# Patient Record
Sex: FEMALE | Race: ASIAN | NOT HISPANIC OR LATINO | URBAN - METROPOLITAN AREA
[De-identification: names, ages, dates, MRNs, and addresses within clinical notes are randomized per-mention and may not be internally consistent; named-entity substitution may affect disease eponyms.]

---

## 2020-01-03 PROCEDURE — 99285 EMERGENCY DEPT VISIT HI MDM: CPT

## 2021-01-01 ENCOUNTER — INPATIENT (INPATIENT)
Facility: HOSPITAL | Age: 61
LOS: 1 days | End: 2021-01-04
Attending: OBSTETRICS & GYNECOLOGY | Admitting: OBSTETRICS & GYNECOLOGY
Payer: MEDICARE

## 2021-01-01 VITALS
HEART RATE: 102 BPM | OXYGEN SATURATION: 92 % | RESPIRATION RATE: 3 BRPM | SYSTOLIC BLOOD PRESSURE: 75 MMHG | TEMPERATURE: 98 F | DIASTOLIC BLOOD PRESSURE: 49 MMHG

## 2021-01-01 VITALS
OXYGEN SATURATION: 98 % | SYSTOLIC BLOOD PRESSURE: 84 MMHG | TEMPERATURE: 95 F | HEART RATE: 125 BPM | DIASTOLIC BLOOD PRESSURE: 58 MMHG | RESPIRATION RATE: 20 BRPM

## 2021-01-01 DIAGNOSIS — K63.1 PERFORATION OF INTESTINE (NONTRAUMATIC): ICD-10-CM

## 2021-01-01 DIAGNOSIS — C79.9 SECONDARY MALIGNANT NEOPLASM OF UNSPECIFIED SITE: ICD-10-CM

## 2021-01-01 LAB
ALBUMIN SERPL ELPH-MCNC: 3.2 G/DL — LOW (ref 3.3–5)
ALP SERPL-CCNC: 123 U/L — HIGH (ref 40–120)
ALT FLD-CCNC: 32 U/L — SIGNIFICANT CHANGE UP (ref 4–33)
ANION GAP SERPL CALC-SCNC: 10 MMOL/L — SIGNIFICANT CHANGE UP (ref 7–14)
ANION GAP SERPL CALC-SCNC: 17 MMOL/L — HIGH (ref 7–14)
APPEARANCE UR: CLEAR — SIGNIFICANT CHANGE UP
APTT BLD: 31.9 SEC — SIGNIFICANT CHANGE UP (ref 27–36.3)
AST SERPL-CCNC: 60 U/L — HIGH (ref 4–32)
B PERT DNA SPEC QL NAA+PROBE: SIGNIFICANT CHANGE UP
BASOPHILS # BLD AUTO: 0 K/UL — SIGNIFICANT CHANGE UP (ref 0–0.2)
BASOPHILS NFR BLD AUTO: 0 % — SIGNIFICANT CHANGE UP (ref 0–2)
BILIRUB SERPL-MCNC: 0.7 MG/DL — SIGNIFICANT CHANGE UP (ref 0.2–1.2)
BILIRUB UR-MCNC: ABNORMAL
BLD GP AB SCN SERPL QL: NEGATIVE — SIGNIFICANT CHANGE UP
BLOOD GAS VENOUS COMPREHENSIVE RESULT: SIGNIFICANT CHANGE UP
BLOOD GAS VENOUS COMPREHENSIVE RESULT: SIGNIFICANT CHANGE UP
BUN SERPL-MCNC: 24 MG/DL — HIGH (ref 7–23)
BUN SERPL-MCNC: 25 MG/DL — HIGH (ref 7–23)
C PNEUM DNA SPEC QL NAA+PROBE: SIGNIFICANT CHANGE UP
CALCIUM SERPL-MCNC: 7.2 MG/DL — LOW (ref 8.4–10.5)
CALCIUM SERPL-MCNC: 9 MG/DL — SIGNIFICANT CHANGE UP (ref 8.4–10.5)
CHLORIDE SERPL-SCNC: 71 MMOL/L — LOW (ref 98–107)
CHLORIDE SERPL-SCNC: 79 MMOL/L — LOW (ref 98–107)
CO2 SERPL-SCNC: 34 MMOL/L — HIGH (ref 22–31)
CO2 SERPL-SCNC: 35 MMOL/L — HIGH (ref 22–31)
COLOR SPEC: YELLOW — SIGNIFICANT CHANGE UP
CREAT SERPL-MCNC: 0.77 MG/DL — SIGNIFICANT CHANGE UP (ref 0.5–1.3)
CREAT SERPL-MCNC: 0.81 MG/DL — SIGNIFICANT CHANGE UP (ref 0.5–1.3)
CULTURE RESULTS: NO GROWTH — SIGNIFICANT CHANGE UP
DIFF PNL FLD: NEGATIVE — SIGNIFICANT CHANGE UP
EOSINOPHIL # BLD AUTO: 0 K/UL — SIGNIFICANT CHANGE UP (ref 0–0.5)
EOSINOPHIL NFR BLD AUTO: 0 % — SIGNIFICANT CHANGE UP (ref 0–6)
FLUAV H1 2009 PAND RNA SPEC QL NAA+PROBE: SIGNIFICANT CHANGE UP
FLUAV H1 RNA SPEC QL NAA+PROBE: SIGNIFICANT CHANGE UP
FLUAV H3 RNA SPEC QL NAA+PROBE: SIGNIFICANT CHANGE UP
FLUAV SUBTYP SPEC NAA+PROBE: SIGNIFICANT CHANGE UP
FLUBV RNA SPEC QL NAA+PROBE: SIGNIFICANT CHANGE UP
GLUCOSE SERPL-MCNC: 103 MG/DL — HIGH (ref 70–99)
GLUCOSE SERPL-MCNC: 122 MG/DL — HIGH (ref 70–99)
GLUCOSE UR QL: NEGATIVE — SIGNIFICANT CHANGE UP
HADV DNA SPEC QL NAA+PROBE: SIGNIFICANT CHANGE UP
HCOV PNL SPEC NAA+PROBE: SIGNIFICANT CHANGE UP
HCT VFR BLD CALC: 40.6 % — SIGNIFICANT CHANGE UP (ref 34.5–45)
HGB BLD-MCNC: 13.5 G/DL — SIGNIFICANT CHANGE UP (ref 11.5–15.5)
HMPV RNA SPEC QL NAA+PROBE: SIGNIFICANT CHANGE UP
HPIV1 RNA SPEC QL NAA+PROBE: SIGNIFICANT CHANGE UP
HPIV2 RNA SPEC QL NAA+PROBE: SIGNIFICANT CHANGE UP
HPIV3 RNA SPEC QL NAA+PROBE: SIGNIFICANT CHANGE UP
HPIV4 RNA SPEC QL NAA+PROBE: SIGNIFICANT CHANGE UP
IANC: 1.98 K/UL — SIGNIFICANT CHANGE UP (ref 1.5–8.5)
INR BLD: <0.9 RATIO — LOW (ref 0.88–1.16)
KETONES UR-MCNC: ABNORMAL
LEUKOCYTE ESTERASE UR-ACNC: NEGATIVE — SIGNIFICANT CHANGE UP
LYMPHOCYTES # BLD AUTO: 0.38 K/UL — LOW (ref 1–3.3)
LYMPHOCYTES # BLD AUTO: 12.2 % — LOW (ref 13–44)
MCHC RBC-ENTMCNC: 26.9 PG — LOW (ref 27–34)
MCHC RBC-ENTMCNC: 33.3 GM/DL — SIGNIFICANT CHANGE UP (ref 32–36)
MCV RBC AUTO: 81 FL — SIGNIFICANT CHANGE UP (ref 80–100)
MONOCYTES # BLD AUTO: 0.15 K/UL — SIGNIFICANT CHANGE UP (ref 0–0.9)
MONOCYTES NFR BLD AUTO: 4.7 % — SIGNIFICANT CHANGE UP (ref 2–14)
NEUTROPHILS # BLD AUTO: 2.27 K/UL — SIGNIFICANT CHANGE UP (ref 1.8–7.4)
NEUTROPHILS NFR BLD AUTO: 63.6 % — SIGNIFICANT CHANGE UP (ref 43–77)
NITRITE UR-MCNC: NEGATIVE — SIGNIFICANT CHANGE UP
PH UR: 6.5 — SIGNIFICANT CHANGE UP (ref 5–8)
PLATELET # BLD AUTO: 342 K/UL — SIGNIFICANT CHANGE UP (ref 150–400)
POTASSIUM SERPL-MCNC: 3.3 MMOL/L — LOW (ref 3.5–5.3)
POTASSIUM SERPL-MCNC: 3.7 MMOL/L — SIGNIFICANT CHANGE UP (ref 3.5–5.3)
POTASSIUM SERPL-SCNC: 3.3 MMOL/L — LOW (ref 3.5–5.3)
POTASSIUM SERPL-SCNC: 3.7 MMOL/L — SIGNIFICANT CHANGE UP (ref 3.5–5.3)
PROT SERPL-MCNC: 5.9 G/DL — LOW (ref 6–8.3)
PROT UR-MCNC: ABNORMAL
PROTHROM AB SERPL-ACNC: 9.9 SEC — LOW (ref 10.6–13.6)
RAPID RVP RESULT: SIGNIFICANT CHANGE UP
RBC # BLD: 5.01 M/UL — SIGNIFICANT CHANGE UP (ref 3.8–5.2)
RBC # FLD: 17.2 % — HIGH (ref 10.3–14.5)
RH IG SCN BLD-IMP: POSITIVE — SIGNIFICANT CHANGE UP
RV+EV RNA SPEC QL NAA+PROBE: SIGNIFICANT CHANGE UP
SARS-COV-2 RNA SPEC QL NAA+PROBE: SIGNIFICANT CHANGE UP
SODIUM SERPL-SCNC: 122 MMOL/L — LOW (ref 135–145)
SODIUM SERPL-SCNC: 124 MMOL/L — LOW (ref 135–145)
SP GR SPEC: 1.03 — HIGH (ref 1.01–1.02)
SPECIMEN SOURCE: SIGNIFICANT CHANGE UP
UROBILINOGEN FLD QL: ABNORMAL
WBC # BLD: 3.11 K/UL — LOW (ref 3.8–10.5)
WBC # FLD AUTO: 3.11 K/UL — LOW (ref 3.8–10.5)

## 2021-01-01 PROCEDURE — 71045 X-RAY EXAM CHEST 1 VIEW: CPT | Mod: 26

## 2021-01-01 PROCEDURE — 74177 CT ABD & PELVIS W/CONTRAST: CPT | Mod: 26

## 2021-01-01 PROCEDURE — 99223 1ST HOSP IP/OBS HIGH 75: CPT

## 2021-01-01 PROCEDURE — 99285 EMERGENCY DEPT VISIT HI MDM: CPT

## 2021-01-01 RX ORDER — FENTANYL CITRATE 50 UG/ML
50 INJECTION INTRAVENOUS ONCE
Refills: 0 | Status: DISCONTINUED | OUTPATIENT
Start: 2021-01-01 | End: 2021-01-01

## 2021-01-01 RX ORDER — ROBINUL 0.2 MG/ML
0.4 INJECTION INTRAMUSCULAR; INTRAVENOUS EVERY 4 HOURS
Refills: 0 | Status: DISCONTINUED | OUTPATIENT
Start: 2021-01-01 | End: 2021-01-01

## 2021-01-01 RX ORDER — HYDROMORPHONE HYDROCHLORIDE 2 MG/ML
1 INJECTION INTRAMUSCULAR; INTRAVENOUS; SUBCUTANEOUS
Refills: 0 | Status: DISCONTINUED | OUTPATIENT
Start: 2021-01-01 | End: 2021-01-01

## 2021-01-01 RX ORDER — SODIUM CHLORIDE 9 MG/ML
1000 INJECTION INTRAMUSCULAR; INTRAVENOUS; SUBCUTANEOUS
Refills: 0 | Status: DISCONTINUED | OUTPATIENT
Start: 2021-01-01 | End: 2021-01-01

## 2021-01-01 RX ORDER — SODIUM CHLORIDE 9 MG/ML
2000 INJECTION INTRAMUSCULAR; INTRAVENOUS; SUBCUTANEOUS ONCE
Refills: 0 | Status: COMPLETED | OUTPATIENT
Start: 2021-01-01 | End: 2021-01-01

## 2021-01-01 RX ORDER — HYDROMORPHONE HYDROCHLORIDE 2 MG/ML
0.2 INJECTION INTRAMUSCULAR; INTRAVENOUS; SUBCUTANEOUS
Qty: 10 | Refills: 0 | Status: DISCONTINUED | OUTPATIENT
Start: 2021-01-01 | End: 2021-01-01

## 2021-01-01 RX ORDER — PIPERACILLIN AND TAZOBACTAM 4; .5 G/20ML; G/20ML
3.38 INJECTION, POWDER, LYOPHILIZED, FOR SOLUTION INTRAVENOUS ONCE
Refills: 0 | Status: COMPLETED | OUTPATIENT
Start: 2021-01-01 | End: 2021-01-01

## 2021-01-01 RX ORDER — SODIUM CHLORIDE 9 MG/ML
1000 INJECTION INTRAMUSCULAR; INTRAVENOUS; SUBCUTANEOUS ONCE
Refills: 0 | Status: COMPLETED | OUTPATIENT
Start: 2021-01-01 | End: 2021-01-01

## 2021-01-01 RX ORDER — HYDROMORPHONE HYDROCHLORIDE 2 MG/ML
1 INJECTION INTRAMUSCULAR; INTRAVENOUS; SUBCUTANEOUS ONCE
Refills: 0 | Status: DISCONTINUED | OUTPATIENT
Start: 2021-01-01 | End: 2021-01-01

## 2021-01-01 RX ORDER — HYDROMORPHONE HYDROCHLORIDE 2 MG/ML
0.2 INJECTION INTRAMUSCULAR; INTRAVENOUS; SUBCUTANEOUS
Qty: 100 | Refills: 0 | Status: DISCONTINUED | OUTPATIENT
Start: 2021-01-01 | End: 2021-01-01

## 2021-01-01 RX ORDER — PIPERACILLIN AND TAZOBACTAM 4; .5 G/20ML; G/20ML
3.38 INJECTION, POWDER, LYOPHILIZED, FOR SOLUTION INTRAVENOUS EVERY 8 HOURS
Refills: 0 | Status: DISCONTINUED | OUTPATIENT
Start: 2021-01-01 | End: 2021-01-01

## 2021-01-01 RX ADMIN — HYDROMORPHONE HYDROCHLORIDE 0.2 MG/HR: 2 INJECTION INTRAMUSCULAR; INTRAVENOUS; SUBCUTANEOUS at 13:10

## 2021-01-01 RX ADMIN — FENTANYL CITRATE 50 MICROGRAM(S): 50 INJECTION INTRAVENOUS at 09:12

## 2021-01-01 RX ADMIN — ROBINUL 0.4 MILLIGRAM(S): 0.2 INJECTION INTRAMUSCULAR; INTRAVENOUS at 01:35

## 2021-01-01 RX ADMIN — HYDROMORPHONE HYDROCHLORIDE 1 MILLIGRAM(S): 2 INJECTION INTRAMUSCULAR; INTRAVENOUS; SUBCUTANEOUS at 13:10

## 2021-01-01 RX ADMIN — HYDROMORPHONE HYDROCHLORIDE 1 MILLIGRAM(S): 2 INJECTION INTRAMUSCULAR; INTRAVENOUS; SUBCUTANEOUS at 12:33

## 2021-01-01 RX ADMIN — ROBINUL 0.4 MILLIGRAM(S): 0.2 INJECTION INTRAMUSCULAR; INTRAVENOUS at 05:39

## 2021-01-01 RX ADMIN — ROBINUL 0.4 MILLIGRAM(S): 0.2 INJECTION INTRAMUSCULAR; INTRAVENOUS at 21:36

## 2021-01-01 RX ADMIN — FENTANYL CITRATE 50 MICROGRAM(S): 50 INJECTION INTRAVENOUS at 02:10

## 2021-01-01 RX ADMIN — ROBINUL 0.4 MILLIGRAM(S): 0.2 INJECTION INTRAMUSCULAR; INTRAVENOUS at 17:08

## 2021-01-01 RX ADMIN — PIPERACILLIN AND TAZOBACTAM 200 GRAM(S): 4; .5 INJECTION, POWDER, LYOPHILIZED, FOR SOLUTION INTRAVENOUS at 02:10

## 2021-01-01 RX ADMIN — FENTANYL CITRATE 50 MICROGRAM(S): 50 INJECTION INTRAVENOUS at 07:33

## 2021-01-01 RX ADMIN — FENTANYL CITRATE 50 MICROGRAM(S): 50 INJECTION INTRAVENOUS at 23:46

## 2021-01-01 RX ADMIN — SODIUM CHLORIDE 2000 MILLILITER(S): 9 INJECTION INTRAMUSCULAR; INTRAVENOUS; SUBCUTANEOUS at 23:01

## 2021-01-01 RX ADMIN — FENTANYL CITRATE 50 MICROGRAM(S): 50 INJECTION INTRAVENOUS at 05:31

## 2021-01-01 RX ADMIN — PIPERACILLIN AND TAZOBACTAM 25 GRAM(S): 4; .5 INJECTION, POWDER, LYOPHILIZED, FOR SOLUTION INTRAVENOUS at 17:07

## 2021-01-01 RX ADMIN — PIPERACILLIN AND TAZOBACTAM 3.38 GRAM(S): 4; .5 INJECTION, POWDER, LYOPHILIZED, FOR SOLUTION INTRAVENOUS at 04:27

## 2021-01-01 RX ADMIN — SODIUM CHLORIDE 100 MILLILITER(S): 9 INJECTION INTRAMUSCULAR; INTRAVENOUS; SUBCUTANEOUS at 05:00

## 2021-01-01 RX ADMIN — FENTANYL CITRATE 50 MICROGRAM(S): 50 INJECTION INTRAVENOUS at 02:09

## 2021-01-01 RX ADMIN — SODIUM CHLORIDE 1000 MILLILITER(S): 9 INJECTION INTRAMUSCULAR; INTRAVENOUS; SUBCUTANEOUS at 04:27

## 2021-01-01 RX ADMIN — SODIUM CHLORIDE 2000 MILLILITER(S): 9 INJECTION INTRAMUSCULAR; INTRAVENOUS; SUBCUTANEOUS at 04:27

## 2021-01-01 RX ADMIN — PIPERACILLIN AND TAZOBACTAM 25 GRAM(S): 4; .5 INJECTION, POWDER, LYOPHILIZED, FOR SOLUTION INTRAVENOUS at 21:37

## 2021-01-01 RX ADMIN — FENTANYL CITRATE 50 MICROGRAM(S): 50 INJECTION INTRAVENOUS at 04:27

## 2021-01-01 RX ADMIN — FENTANYL CITRATE 50 MICROGRAM(S): 50 INJECTION INTRAVENOUS at 06:30

## 2021-01-01 RX ADMIN — HYDROMORPHONE HYDROCHLORIDE 1 MILLIGRAM(S): 2 INJECTION INTRAMUSCULAR; INTRAVENOUS; SUBCUTANEOUS at 10:20

## 2021-01-01 RX ADMIN — SODIUM CHLORIDE 1000 MILLILITER(S): 9 INJECTION INTRAMUSCULAR; INTRAVENOUS; SUBCUTANEOUS at 03:26

## 2021-01-02 NOTE — ED ADULT NURSE NOTE - OBJECTIVE STATEMENT
Patient arrives to ED for weakness. Patient able to state her own name. Son at bedside. Patient has midline to left arm but XRAY did not confirm placement. Patient arrives to ED for weakness. Patient able to state her own name. Son at bedside. Patient has midline to left arm but XRAY did not confirm placement. 22g IV placed to right wrist. 18g IV placed via ultrasound to right antecubital area. Per son, patient has peg tube to midline upper abdomen, draining dark, clear, bile appearing drainage. 14 Fr roach placed, 300ml dark yahaira urine in roach bag. Patient arrives to ED for weakness. Patient able to state her own name. Son at bedside. Patient has midline to left arm but XRAY did not confirm placement. 22g IV placed to right wrist. 18g IV placed via ultrasound to right antecubital area. Per son, patient has peg tube to midline upper abdomen, draining dark, clear, bile appearing drainage. Peg tube site clean dry and intact. Belly distended and firm.  14 Fr roach placed, 300ml dark yahaira urine in roach bag. Patient breathing even and on labored, on 3L NC (on 3L NC at home). Buttocks skin intact. Labs sent. Patient medicated ordered. COVID swab sent. Safety maintained. Will continue to monitor.

## 2021-01-02 NOTE — ED ADULT TRIAGE NOTE - CHIEF COMPLAINT QUOTE
patient baseline unresponsive. as per EMS patient had questionable syncope at home. patient hypotensive at triage. as per EMS, patient family had wanted DNR, but no paperwork was presented. patient hypotensive and tachycardic in triage. 3LNC baseline from home   Brice- - Son

## 2021-01-03 NOTE — ED PROVIDER NOTE - ATTENDING CONTRIBUTION TO CARE
DR. BLOCH, ATTENDING MD-  I performed a face to face bedside interview with patient regarding history of present illness, review of symptoms and past medical history. I completed an independent physical exam.  I have discussed patient's plan of care with the resident.  Patient ill appearing, hypotensive,extremely distended tympanitic abd, very tender, large tube draining bilious fluid in epigastrium, extrem with some mottling in thighs, decreased pulses, hearst tachycardic, lungs clear. not answring questions, moaning. Condition guarded.  Spoke extensively with son about goals of care. he stated his mother declined intubation and resuscitation, though he wanted more time with her. Agree with DNI, DNR, comfort measures and noninvasive treatment

## 2021-01-03 NOTE — H&P ADULT - NSHPPHYSICALEXAM_GEN_ALL_CORE
General:frail  Respiratory: CTA B/L  CV: RRR, S1S2, no murmurs, rubs or gallops  Abdominal: Soft, NT, ND +BS, Last BM  Extremities: edema +

## 2021-01-03 NOTE — H&P ADULT - ATTENDING COMMENTS
Patient seen and evaluated in the ED.   Progressive metastatic chemotherapy-refractory ovarian cancer on home hospice care.   Now presents with bowel obstruction and sepsis.   Goals of care discussed with patient and her sons.   Admit for palliative/comfort care.   Will initiate narcotic analgesia drip.   Private room so that patient's family can remain at bedside.   Patient's primary gynecologic oncologist, Dr. Saritha Wen at [facility], notified of admission and terminal status.

## 2021-01-03 NOTE — H&P ADULT - NSHPLABSRESULTS_GEN_ALL_CORE
Lab Results:  CBC  CBC Full  -  ( 2021 23:22 )  WBC Count : 3.11 K/uL  RBC Count : 5.01 M/uL  Hemoglobin : 13.5 g/dL  Hematocrit : 40.6 %  Platelet Count - Automated : 342 K/uL  Mean Cell Volume : 81.0 fL  Mean Cell Hemoglobin : 26.9 pg  Mean Cell Hemoglobin Concentration : 33.3 gm/dL  Auto Neutrophil # : 2.27 K/uL  Auto Lymphocyte # : 0.38 K/uL  Auto Monocyte # : 0.15 K/uL  Auto Eosinophil # : 0.00 K/uL  Auto Basophil # : 0.00 K/uL  Auto Neutrophil % : 63.6 %  Auto Lymphocyte % : 12.2 %  Auto Monocyte % : 4.7 %  Auto Eosinophil % : 0.0 %  Auto Basophil % : 0.0 %    .		Differential:	[] Automated		[] Manual  Chemistry                        13.5   3.11  )-----------( 342      ( 2021 23:22 )             40.6     01-03    124<L>  |  79<L>  |  24<H>  ----------------------------<  103<H>  3.7   |  35<H>  |  0.77    Ca    7.2<L>      2021 01:39    TPro  5.9<L>  /  Alb  3.2<L>  /  TBili  0.7  /  DBili  x   /  AST  60<H>  /  ALT  32  /  AlkPhos  123<H>      LIVER FUNCTIONS - ( 2021 23:22 )  Alb: 3.2 g/dL / Pro: 5.9 g/dL / ALK PHOS: 123 U/L / ALT: 32 U/L / AST: 60 U/L / GGT: x           PT/INR - ( 2021 23:22 )   PT: 9.9 sec;   INR: <0.90 ratio         PTT - ( 2021 23:22 )  PTT:31.9 sec  Urinalysis Basic - ( 2021 23:52 )    Color: Yellow / Appearance: Clear / S.029 / pH: x  Gluc: x / Ketone: Small  / Bili: Small / Urobili: 6 mg/dL   Blood: x / Protein: 30 mg/dL / Nitrite: Negative   Leuk Esterase: Negative / RBC: 0-2 /HPF / WBC 0-2 /HPF   Sq Epi: x / Non Sq Epi: FEW /HPF / Bacteria: Negative            MICROBIOLOGY/CULTURES:      RADIOLOGY RESULTS: reviewed

## 2021-01-03 NOTE — CONSULT NOTE ADULT - PROBLEM SELECTOR RECOMMENDATION 2
Fellow Note    Agree with above. History reviewed with Dr. Beauchamp. Ms. Hogue has a history of recurrent ovarian cancer and was DNR/DNI with venting PEG on home hospice BIBEMS found to have perforated viscus.    VS reviewed: hypotensive, tachycardic  Labs reviewed: Lactate 5.0    Due to poor prognosis surgery would not have any benefit and could prolong pain and suffering. Recommend comfort care with narcotic gtt.    Ravinder LI Fellow Note    Agree with above. History reviewed with Dr. Beauchamp. Ms. Hogue has a history of recurrent ovarian cancer and was DNR/DNI with venting PEG on home hospice BIBEMS found to have perforated viscus.    VS reviewed: hypotensive, tachycardic  Labs reviewed: Lactate 5.0    Due to poor prognosis surgery would not have any benefit and could prolong pain and suffering. Recommend comfort care with narcotic gtt.    Ravinder LI    Addendum  Patient seen and approximately 830 AM in the ER. Pain responds to fentanyl, however, would benefit from continuous infusion. Discussed this with family including that Ms. Hogue would likely become less responsive and more lethargic with the goal of decreasing her pain. Family agrees.  I discussed the plan with medicine MAR and I placed the order for carlee Castellon MD

## 2021-01-03 NOTE — H&P ADULT - PROBLEM SELECTOR PLAN 1
sec to metastatic cancer   surgery and GYN consult appreciated  comfort measures only  cw PCA pump  palliative consult placed

## 2021-01-03 NOTE — ED ADULT NURSE REASSESSMENT NOTE - NS ED NURSE REASSESS COMMENT FT1
Son at bedside, talked to family and family agreed on DNR status. MOLST form in chart. Pt aware of situation and do not want pressors. Pt with comfort measures at this time. Pt being medicated for comfort measures. Pt remains hypotensive. Pt adjusted for comfort. Will cont. to monitor.

## 2021-01-03 NOTE — CONSULT NOTE ADULT - SUBJECTIVE AND OBJECTIVE BOX
Surgical Oncology Consult  Consulting surgical team: Surgical Oncology (D Team)  Consulting attending: Jayce Gutierrez    HPI: 60F hx metastatic ovarian cancer presenting after syncope. Patient was diagnosed with ovarian cancer in 2017 and underwent hysterectomy and bilateral salpingooophorectomy at [facility]. Has received all of her care there, which also included a venting PEG for malignant bowel obstruction. She was at home, reportedly for home hospice tolerating liquid meals. This evening as she got up to go to the bathroom, she had a syncopal episode and her family called an ambulance. She was brought to the [facility] ED for further evaluation.       PAST MEDICAL HISTORY:  Metastatic ovarian cancer    PAST SURGICAL HISTORY:  Hysterectomy, BSO    MEDICATIONS:      ALLERGIES:  No Known Allergies      VITALS & I/Os:  Vital Signs Last 24 Hrs  T(C): 36.1 (2021 23:04), Max: 36.1 (2021 23:04)  T(F): 97 (2021 23:04), Max: 97 (2021 23:04)  HR: 106 (2021 01:24) (89 - 125)  BP: 109/72 (2021 01:24) (68/32 - 111/73)  BP(mean): 84 (2021 01:24) (44 - 89)  RR: 22 (2021 01:24) (14 - 22)  SpO2: 100% (2021 01:24) (98% - 100%)    I&O's Summary      PHYSICAL EXAM:  General: In acute distress  Respiratory: Mildly labored  Cardiovascular: tachycardic  Abdominal: Soft, distended, diffusely tender with mild guarding. PEG in place draining dark bilious fluid to gravity  Extremities: Warm    LABS:                        13.5   3.11  )-----------( 342      ( 2021 23:22 )             40.6     01-03    124<L>  |  79<L>  |  24<H>  ----------------------------<  103<H>  3.7   |  35<H>  |  0.77    Ca    7.2<L>      2021 01:39    TPro  5.9<L>  /  Alb  3.2<L>  /  TBili  0.7  /  DBili  x   /  AST  60<H>  /  ALT  32  /  AlkPhos  123<H>      Lactate:   @ 23:22  5.0    PT/INR - ( 2021 23:22 )   PT: 9.9 sec;   INR: <0.90 ratio         PTT - ( 2021 23:22 )  PTT:31.9 sec    Urinalysis Basic - ( 2021 23:52 )    Color: Yellow / Appearance: Clear / S.029 / pH: x  Gluc: x / Ketone: Small  / Bili: Small / Urobili: 6 mg/dL   Blood: x / Protein: 30 mg/dL / Nitrite: Negative   Leuk Esterase: Negative / RBC: 0-2 /HPF / WBC 0-2 /HPF   Sq Epi: x / Non Sq Epi: FEW /HPF / Bacteria: Negative        IMAGING:  < from: CT Abdomen and Pelvis w/ IV Cont (21 @ 01:12) >  EXAM:  CT ABDOMEN AND PELVIS IC        PROCEDURE DATE:  Paco  3 2021         INTERPRETATION:  CLINICAL INFORMATION: Weakness. Concern for small bowel obstruction. Question free air on radiograph    COMPARISON: None.    PROCEDURE:  CT of the Abdomen and Pelvis was performed with intravenous contrast.  Intravenous contrast: 90 ml Omnipaque 350. 10 ml discarded.  Oral contrast: None.  Sagittal and coronal reformats were performed.    FINDINGS:  LOWER CHEST: Small bilateral pleural effusions, left greater than right. Median sternotomy.    LIVER: Scattered hypodense lesions about the liver are suspicious for metastatic disease  BILE DUCTS: Normal caliber.  GALLBLADDER: Within normal limits.  SPLEEN: Nonspecific scattered hypodense lesions in the spleen. Suggestion of subscapular calcifications.  PANCREAS: Within normal limits.  ADRENALS: Nonspecific bilateral hypodense nodules.  KIDNEYS: Symmetrically enhancing without hydronephrosis.    BLADDER: Decompressed with Sims catheter.  REPRODUCTIVE ORGANS: Hysterectomy.    BOWEL: Massively dilated fluid-filled, partially fecalized loops of small bowel with transition point in the pelvis (2:137) at the level of short segment diffuse circumferential wall thickening, suspicious for malignancy. Twisting of the small bowel mesentery also identified at the level of the transition point, possibility of bowel ischemia considered in the differential. Correlate with lactic acid. Gastrostomy tube in the stomach  PERITONEUM: Large pneumoperitoneum. Mild to moderate ascites with hyperdensity identified within the pelvis, extravasation of contrast suspected.  VESSELS: Within normal limits.  RETROPERITONEUM/LYMPH NODES: Mesenteric and retroperitoneal lymphadenopathy. For reference a para-aortic lymphnode measures 2.0 x 1.4 cm (2:65). Suggestion of omental caking or peritoneal carcinomatosis.  ABDOMINAL WALL: Within normal limits.  BONES: Degenerative changes.    IMPRESSION:    High-grade small bowel obstruction with transient point in the pelvis, as above. Diffuse focal circumferential small bowel wall thickening at the level of the obstruction concerning for nonspecific small bowel malignancy. Additional twisting of the small bowel mesentery identified at the level of the transition point, possibility of bowel ischemia considered in the differential. Correlate with lactic acid.    Large pneumoperitoneum concerning for bowel perforation. Mild to moderate ascites with hyperdensity identified within the pelvis, extravasation of contrast suspected.    Hepatic, splenic, and adrenal lesions concerning for metastatic disease. Retroperitoneal lymphadenopathy.Suggestion of omental caking or peritoneal carcinomatosis.    Small bilateral pleural effusions, left greater than right.    Additional findings as mentioned above.    Findings were discussed with Dr. MATHIEU MORSE [phone] 1/3/2021 1:27 AM by Dr. Jara with read back confirmation.    LASHANDA JARA MD; Resident Interventional Radiology  This document has been electronically signed.  ARLETTE CELIS MD; Attending Radiologist  This document has been electronically signed. [place]  3 2021  2:18AM    < end of copied text >

## 2021-01-03 NOTE — H&P ADULT - ASSESSMENT
60F hx metastatic ovarian cancer, hx SBO, w/ PEG tube, p/w syncope, weakness. Pt diagnosed w/ ovarian cancer in 2017, s/p EDWARDS/EDGAR p/w venting PEG for malignant bowel obstruction. Pt was on home hospice tolerating liquid only meals, this past evening on way to bathroom had syncopal episode, brought to ED for further eval. pt found to have pneumoperitoneum , comfort measures only  60F hx progressive chemotherapy refractory metastatic ovarian cancer, hx SBO, w/ PEG tube, p/w syncope, weakness. She was admitted with bowel perforation i setting of malignant bowel obstruction and sepsis. She is DNR and has elected for comfort measures only.

## 2021-01-03 NOTE — CONSULT NOTE ADULT - SUBJECTIVE AND OBJECTIVE BOX
Gyn Onc Consult Note  LEBRON MARKS  60y  Female [id number]    HPI:      Name of GYN Oncologist: Dr. Saritha Wen     OBHx:    GYNHx: Denies fibroids, cysts, endometriosis, STI's, Abnormal pap smears     PAST MEDICAL & SURGICAL HISTORY:  No pertinent past medical history        Meds:     Allergies    No Known Allergies    Intolerances        FAMILY HISTORY:      Social:     Vital Signs Last 24 Hrs  T(C): 36.1 (2021 23:04), Max: 36.1 (2021 23:04)  T(F): 97 (2021 23:04), Max: 97 (2021 23:04)  HR: 84 (2021 04:04) (84 - 125)  BP: 101/72 (2021 04:04) (68/32 - 111/73)  BP(mean): 84 (2021 01:24) (44 - 89)  RR: 16 (2021 04:04) (14 - 22)  SpO2: 100% (2021 04:04) (98% - 100%)    Physical Exam:   General: Appears uncomfortable, not responsive to questioning.   Abd: Distended, diffuse tenderness to palpation. PEG tube in place, draining dark bilious fluid.   Skin: Dry, intact     LABS:                        13.5   3.11  )-----------( 342      ( 2021 23:22 )             40.6     01-03    124<L>  |  79<L>  |  24<H>  ----------------------------<  103<H>  3.7   |  35<H>  |  0.77    Ca    7.2<L>      2021 01:39    TPro  5.9<L>  /  Alb  3.2<L>  /  TBili  0.7  /  DBili  x   /  AST  60<H>  /  ALT  32  /  AlkPhos  123<H>      PT/INR - ( 2021 23:22 )   PT: 9.9 sec;   INR: <0.90 ratio    PTT - ( 2021 23:22 )  PTT:31.9 sec    Urinalysis Basic - ( 2021 23:52 )  Color: Yellow / Appearance: Clear / S.029 / pH: x  Gluc: x / Ketone: Small  / Bili: Small / Urobili: 6 mg/dL   Blood: x / Protein: 30 mg/dL / Nitrite: Negative   Leuk Esterase: Negative / RBC: 0-2 /HPF / WBC 0-2 /HPF   Sq Epi: x / Non Sq Epi: FEW /HPF / Bacteria: Negative      RADIOLOGY & ADDITIONAL STUDIES:  < from: CT Abdomen and Pelvis w/ IV Cont (21 @ 01:12) >  EXAM:  CT ABDOMEN AND PELVIS IC        PROCEDURE DATE:  Paco  3 2021         INTERPRETATION:  CLINICAL INFORMATION: Weakness. Concern for small bowel obstruction. Question free air on radiograph    COMPARISON: None.    PROCEDURE:  CT of the Abdomen and Pelvis was performed with intravenous contrast.  Intravenous contrast: 90 ml Omnipaque 350. 10 ml discarded.  Oral contrast: None.  Sagittal and coronal reformats were performed.    FINDINGS:  LOWER CHEST: Small bilateral pleural effusions, left greater than right. Median sternotomy.    LIVER: Scattered hypodense lesions about the liver are suspicious for metastatic disease  BILE DUCTS: Normal caliber.  GALLBLADDER: Within normal limits.  SPLEEN: Nonspecific scattered hypodense lesions in the spleen. Suggestion of subscapular calcifications.  PANCREAS: Within normal limits.  ADRENALS: Nonspecific bilateral hypodense nodules.  KIDNEYS: Symmetrically enhancing without hydronephrosis.    BLADDER: Decompressed with Sims catheter.  REPRODUCTIVE ORGANS: Hysterectomy.    BOWEL: Massively dilated fluid-filled, partially fecalized loops of small bowel with transition point in the pelvis (2:137) at the level of short segment diffuse circumferential wall thickening, suspicious for malignancy. Twisting of the small bowel mesentery also identified at the level of the transition point, possibility of bowel ischemia considered in the differential. Correlate with lactic acid. Gastrostomy tube in the stomach  PERITONEUM: Large pneumoperitoneum. Mild to moderate ascites with hyperdensity identified within the pelvis, extravasation of contrast suspected.  VESSELS: Within normal limits.  RETROPERITONEUM/LYMPH NODES: Mesenteric and retroperitoneal lymphadenopathy. For reference a para-aortic lymphnode measures 2.0 x 1.4 cm (2:65). Suggestion of omental caking or peritoneal carcinomatosis.  ABDOMINAL WALL: Within normal limits.  BONES: Degenerative changes.    IMPRESSION:    High-grade small bowel obstruction with transient point in the pelvis, as above. Diffuse focal circumferential small bowel wall thickening at the level of the obstruction concerning for nonspecific small bowel malignancy. Additional twisting of the small bowel mesentery identified at the level of the transition point, possibility of bowel ischemia considered in the differential. Correlate with lactic acid.    Large pneumoperitoneum concerning for bowel perforation. Mild to moderate ascites with hyperdensity identified within the pelvis, extravasation of contrast suspected.    Hepatic, splenic, and adrenal lesions concerning for metastatic disease. Retroperitoneal lymphadenopathy.Suggestion of omental caking or peritoneal carcinomatosis.    Small bilateral pleural effusions, left greater than right.    Additional findings as mentioned above.    Findings were discussed with Dr. MATHIEU MORSE [phone] 1/3/2021 1:27 AM by Dr. Jara with read back confirmation.            LASHANDA JARA MD; Resident Interventional Radiology  This document has been electronically signed.  ARLETTE CELIS MD; Attending Radiologist  This document has been electronically signed. Paco  3 2021  2:18AM    < end of copied text >   Gyn Onc Consult Note  LEBRON MARKS  60y  Female [id number]    59y/o F with recurrent metastatic ovarian cancer presenting to [facility] ED after syncopal episode while at home at approximately 8:30 in the evening 21. Reportedly, patient had a syncopal episode upon rising to go to the bathroom, prompting her family to call EMS. Patient noted to be largely unresponsive to questioning while in ED. History obtained through patients son and healthcare proxy, Brice Marks:     Ms. Marks was initially diagnosed with ovarian cancer in , and has undergone all cancer treatment including surgery (QUINTON BSO, staging) and chemotherapy through [facility]. Her son reports that she discontinued chemotherapy several months ago. She was most recently admitted to [facility] 20 and discharged late November with a partial SBO. At that time, the patient and her family declined surgery. She underwent placement of a venting PEG, and was discharged home tolerating a liquid diet with home hospice.     He reports that the patient is DNR/DNI, however at this point desires surgery for her complete small bowel obstruction and likely perforated viscus.    She endorses abdominal bloating and diffuse pain.      Name of GYN Oncologist: Dr. Saritha Wen, [facility]     Allergies: NKDA    Vital Signs Last 24 Hrs  T(C): 36.1 (2021 23:04), Max: 36.1 (2021 23:04)  T(F): 97 (2021 23:04), Max: 97 (2021 23:04)  HR: 84 (2021 04:04) (84 - 125)  BP: 101/72 (2021 04:04) (68/32 - 111/73)  BP(mean): 84 (2021 01:24) (44 - 89)  RR: 16 (2021 04:04) (14 - 22)  SpO2: 100% (2021 04:04) (98% - 100%)    Physical Exam:   General: Appears uncomfortable, not responsive to questioning.   Abd: Distended, diffuse tenderness to palpation. PEG tube in place, draining dark bilious fluid.   Skin: Dry, intact     LABS:                        13.5   3.11  )-----------( 342      ( 2021 23:22 )             40.6     01-03    124<L>  |  79<L>  |  24<H>  ----------------------------<  103<H>  3.7   |  35<H>  |  0.77    Ca    7.2<L>      2021 01:39    TPro  5.9<L>  /  Alb  3.2<L>  /  TBili  0.7  /  DBili  x   /  AST  60<H>  /  ALT  32  /  AlkPhos  123<H>      PT/INR - ( 2021 23:22 )   PT: 9.9 sec;   INR: <0.90 ratio    PTT - ( 2021 23:22 )  PTT:31.9 sec    Urinalysis Basic - ( 2021 23:52 )  Color: Yellow / Appearance: Clear / S.029 / pH: x  Gluc: x / Ketone: Small  / Bili: Small / Urobili: 6 mg/dL   Blood: x / Protein: 30 mg/dL / Nitrite: Negative   Leuk Esterase: Negative / RBC: 0-2 /HPF / WBC 0-2 /HPF   Sq Epi: x / Non Sq Epi: FEW /HPF / Bacteria: Negative      RADIOLOGY & ADDITIONAL STUDIES:  < from: CT Abdomen and Pelvis w/ IV Cont (21 @ 01:12) >  EXAM:  CT ABDOMEN AND PELVIS IC        PROCEDURE DATE:  Paco  3 2021         INTERPRETATION:  CLINICAL INFORMATION: Weakness. Concern for small bowel obstruction. Question free air on radiograph    COMPARISON: None.    PROCEDURE:  CT of the Abdomen and Pelvis was performed with intravenous contrast.  Intravenous contrast: 90 ml Omnipaque 350. 10 ml discarded.  Oral contrast: None.  Sagittal and coronal reformats were performed.    FINDINGS:  LOWER CHEST: Small bilateral pleural effusions, left greater than right. Median sternotomy.    LIVER: Scattered hypodense lesions about the liver are suspicious for metastatic disease  BILE DUCTS: Normal caliber.  GALLBLADDER: Within normal limits.  SPLEEN: Nonspecific scattered hypodense lesions in the spleen. Suggestion of subscapular calcifications.  PANCREAS: Within normal limits.  ADRENALS: Nonspecific bilateral hypodense nodules.  KIDNEYS: Symmetrically enhancing without hydronephrosis.    BLADDER: Decompressed with Sims catheter.  REPRODUCTIVE ORGANS: Hysterectomy.    BOWEL: Massively dilated fluid-filled, partially fecalized loops of small bowel with transition point in the pelvis (2:137) at the level of short segment diffuse circumferential wall thickening, suspicious for malignancy. Twisting of the small bowel mesentery also identified at the level of the transition point, possibility of bowel ischemia considered in the differential. Correlate with lactic acid. Gastrostomy tube in the stomach  PERITONEUM: Large pneumoperitoneum. Mild to moderate ascites with hyperdensity identified within the pelvis, extravasation of contrast suspected.  VESSELS: Within normal limits.  RETROPERITONEUM/LYMPH NODES: Mesenteric and retroperitoneal lymphadenopathy. For reference a para-aortic lymphnode measures 2.0 x 1.4 cm (2:65). Suggestion of omental caking or peritoneal carcinomatosis.  ABDOMINAL WALL: Within normal limits.  BONES: Degenerative changes.    IMPRESSION:    High-grade small bowel obstruction with transient point in the pelvis, as above. Diffuse focal circumferential small bowel wall thickening at the level of the obstruction concerning for nonspecific small bowel malignancy. Additional twisting of the small bowel mesentery identified at the level of the transition point, possibility of bowel ischemia considered in the differential. Correlate with lactic acid.    Large pneumoperitoneum concerning for bowel perforation. Mild to moderate ascites with hyperdensity identified within the pelvis, extravasation of contrast suspected.    Hepatic, splenic, and adrenal lesions concerning for metastatic disease. Retroperitoneal lymphadenopathy.Suggestion of omental caking or peritoneal carcinomatosis.    Small bilateral pleural effusions, left greater than right.    Additional findings as mentioned above.    Findings were discussed with Dr. MATHIEU MORSE [phone] 1/3/2021 1:27 AM by Dr. Jara with read back confirmation.            LASHANDA JARA MD; Resident Interventional Radiology  This document has been electronically signed.  ARLETTE CELIS MD; Attending Radiologist  This document has been electronically signed. Paco  3 2021  2:18AM    < end of copied text >   Gyn Onc Consult Note  LEBRON MARKS  60y  Female [id number]    61y/o F with recurrent metastatic ovarian cancer presenting to [facility] ED after syncopal episode while at home at approximately 8:30 in the evening 21. Reportedly, patient had a syncopal episode upon rising to go to the bathroom, prompting her family to call EMS. Patient noted to be largely unresponsive to questioning while in ED. History obtained through patients son and healthcare proxy, Brice Marks:     Ms. Marks was initially diagnosed with ovarian cancer in , and has undergone all cancer treatment including surgery (hysterectomy with BSO, staging) and chemotherapy through [facility]. Her son reports that she discontinued chemotherapy several months ago. She was most recently admitted to [facility] 20 and discharged late November with a partial SBO. At that time, the patient and her family declined surgery. She underwent placement of a venting PEG, and was discharged home tolerating a liquid diet with home hospice.     He reports that the patient is DNR/DNI, however at this point desires surgery for her complete small bowel obstruction and likely perforated viscus.    She endorses abdominal bloating and diffuse pain.      Name of GYN Oncologist: Dr. Saritha eWn, [facility]     Allergies: NKDA    Vital Signs Last 24 Hrs  T(C): 36.1 (2021 23:04), Max: 36.1 (2021 23:04)  T(F): 97 (2021 23:04), Max: 97 (2021 23:04)  HR: 84 (2021 04:04) (84 - 125)  BP: 101/72 (2021 04:04) (68/32 - 111/73)  BP(mean): 84 (2021 01:24) (44 - 89)  RR: 16 (2021 04:04) (14 - 22)  SpO2: 100% (2021 04:04) (98% - 100%)    Physical Exam:   General: Appears uncomfortable, not responsive to questioning.   Pulm: Labored breathing  Abd: Distended, diffuse tenderness to palpation. PEG tube in place, draining dark bilious fluid.   Skin: Dry, intact     LABS:                        13.5   3.11  )-----------( 342      ( 2021 23:22 )             40.6         124<L>  |  79<L>  |  24<H>  ----------------------------<  103<H>  3.7   |  35<H>  |  0.77    Ca    7.2<L>      2021 01:39    TPro  5.9<L>  /  Alb  3.2<L>  /  TBili  0.7  /  DBili  x   /  AST  60<H>  /  ALT  32  /  AlkPhos  123<H>      PT/INR - ( 2021 23:22 )   PT: 9.9 sec;   INR: <0.90 ratio    PTT - ( 2021 23:22 )  PTT:31.9 sec    Urinalysis Basic - ( 2021 23:52 )  Color: Yellow / Appearance: Clear / S.029 / pH: x  Gluc: x / Ketone: Small  / Bili: Small / Urobili: 6 mg/dL   Blood: x / Protein: 30 mg/dL / Nitrite: Negative   Leuk Esterase: Negative / RBC: 0-2 /HPF / WBC 0-2 /HPF   Sq Epi: x / Non Sq Epi: FEW /HPF / Bacteria: Negative      RADIOLOGY & ADDITIONAL STUDIES:  < from: CT Abdomen and Pelvis w/ IV Cont (21 @ 01:12) >  EXAM:  CT ABDOMEN AND PELVIS IC      PROCEDURE DATE:  Paco  3 2021   INTERPRETATION:  CLINICAL INFORMATION: Weakness. Concern for small bowel obstruction. Question free air on radiograph  COMPARISON: None.    PROCEDURE:  CT of the Abdomen and Pelvis was performed with intravenous contrast.  Intravenous contrast: 90 ml Omnipaque 350. 10 ml discarded.  Oral contrast: None.  Sagittal and coronal reformats were performed.    FINDINGS:  LOWER CHEST: Small bilateral pleural effusions, left greater than right. Median sternotomy.    LIVER: Scattered hypodense lesions about the liver are suspicious for metastatic disease  BILE DUCTS: Normal caliber.  GALLBLADDER: Within normal limits.  SPLEEN: Nonspecific scattered hypodense lesions in the spleen. Suggestion of subscapular calcifications.  PANCREAS: Within normal limits.  ADRENALS: Nonspecific bilateral hypodense nodules.  KIDNEYS: Symmetrically enhancing without hydronephrosis.    BLADDER: Decompressed with Sims catheter.  REPRODUCTIVE ORGANS: Hysterectomy.    BOWEL: Massively dilated fluid-filled, partially fecalized loops of small bowel with transition point in the pelvis (2:137) at the level of short segment diffuse circumferential wall thickening, suspicious for malignancy. Twisting of the small bowel mesentery also identified at the level of the transition point, possibility of bowel ischemia considered in the differential. Correlate with lactic acid. Gastrostomy tube in the stomach  PERITONEUM: Large pneumoperitoneum. Mild to moderate ascites with hyperdensity identified within the pelvis, extravasation of contrast suspected.  VESSELS: Within normal limits.  RETROPERITONEUM/LYMPH NODES: Mesenteric and retroperitoneal lymphadenopathy. For reference a para-aortic lymphnode measures 2.0 x 1.4 cm (2:65). Suggestion of omental caking or peritoneal carcinomatosis.  ABDOMINAL WALL: Within normal limits.  BONES: Degenerative changes.    IMPRESSION:  High-grade small bowel obstruction with transient point in the pelvis, as above. Diffuse focal circumferential small bowel wall thickening at the level of the obstruction concerning for nonspecific small bowel malignancy. Additional twisting of the small bowel mesentery identified at the level of the transition point, possibility of bowel ischemia considered in the differential. Correlate with lactic acid.  Large pneumoperitoneum concerning for bowel perforation. Mild to moderate ascites with hyperdensity identified within the pelvis, extravasation of contrast suspected.  Hepatic, splenic, and adrenal lesions concerning for metastatic disease. Retroperitoneal lymphadenopathy.Suggestion of omental caking or peritoneal carcinomatosis.  Small bilateral pleural effusions, left greater than right.  Additional findings as mentioned above.  Findings were discussed with Dr. MATHIEU MORSE [phone] 1/3/2021 1:27 AM by Dr. Jara with read back confirmation.      LASHANDA JARA MD; Resident Interventional Radiology  This document has been electronically signed.  ARLETTE CELIS MD; Attending Radiologist  This document has been electronically signed. Paco  3 2021  2:18AM    < end of copied text >   Gyn Onc Consult Note  LEBRON MARKS  60y  Female [id number]    61y/o F with recurrent metastatic ovarian cancer presenting to [facility] ED after syncopal episode while at home at approximately 8:30 in the evening 21. Reportedly, patient had a syncopal episode upon rising to go to the bathroom, prompting her family to call EMS. Patient noted to be largely unresponsive to questioning while in ED. History obtained through patients son and healthcare proxy, Brice Marks:     Ms. Marks was initially diagnosed with ovarian cancer in , and has undergone all cancer treatment including surgery (hysterectomy with BSO, staging) and chemotherapy through [facility]. Her son reports that she discontinued chemotherapy several months ago. She was most recently admitted to [facility] 20 and discharged late November with a partial SBO. At that time, the patient and her family declined surgery. She underwent placement of a venting PEG, and was discharged home tolerating a liquid diet with home hospice.     He reports that at baseline (prior to syncopal episode) patient is A&O, ambulatory, responsive and communicative. The patient is DNR/DNI, however upon initial presentation to the ED she and her son endorsed desire for surgical intervention for her SBO and likely perforated viscus.    She endorses abdominal bloating and diffuse pain.      Name of GYN Oncologist: Dr. Saritha Wen, [facility]     Allergies: NKDA    Vital Signs Last 24 Hrs  T(C): 36.1 (2021 23:04), Max: 36.1 (2021 23:04)  T(F): 97 (2021 23:04), Max: 97 (2021 23:04)  HR: 84 (2021 04:04) (84 - 125)  BP: 101/72 (2021 04:04) (68/32 - 111/73)  BP(mean): 84 (2021 01:24) (44 - 89)  RR: 16 (2021 04:04) (14 - 22)  SpO2: 100% (2021 04:04) (98% - 100%)    Physical Exam:   General: Appears uncomfortable, not responsive to questioning.   Pulm: Labored breathing  Abd: Distended, diffuse tenderness to palpation. PEG tube in place, draining dark bilious fluid.   Skin: Dry, intact     LABS:                        13.5   3.11  )-----------( 342      ( 2021 23:22 )             40.6         124<L>  |  79<L>  |  24<H>  ----------------------------<  103<H>  3.7   |  35<H>  |  0.77    Ca    7.2<L>      2021 01:39    TPro  5.9<L>  /  Alb  3.2<L>  /  TBili  0.7  /  DBili  x   /  AST  60<H>  /  ALT  32  /  AlkPhos  123<H>      PT/INR - ( 2021 23:22 )   PT: 9.9 sec;   INR: <0.90 ratio    PTT - ( 2021 23:22 )  PTT:31.9 sec    Urinalysis Basic - ( 2021 23:52 )  Color: Yellow / Appearance: Clear / S.029 / pH: x  Gluc: x / Ketone: Small  / Bili: Small / Urobili: 6 mg/dL   Blood: x / Protein: 30 mg/dL / Nitrite: Negative   Leuk Esterase: Negative / RBC: 0-2 /HPF / WBC 0-2 /HPF   Sq Epi: x / Non Sq Epi: FEW /HPF / Bacteria: Negative      RADIOLOGY & ADDITIONAL STUDIES:  < from: CT Abdomen and Pelvis w/ IV Cont (21 @ 01:12) >  EXAM:  CT ABDOMEN AND PELVIS IC      PROCEDURE DATE:  Paco  3 2021   INTERPRETATION:  CLINICAL INFORMATION: Weakness. Concern for small bowel obstruction. Question free air on radiograph  COMPARISON: None.    PROCEDURE:  CT of the Abdomen and Pelvis was performed with intravenous contrast.  Intravenous contrast: 90 ml Omnipaque 350. 10 ml discarded.  Oral contrast: None.  Sagittal and coronal reformats were performed.    FINDINGS:  LOWER CHEST: Small bilateral pleural effusions, left greater than right. Median sternotomy.    LIVER: Scattered hypodense lesions about the liver are suspicious for metastatic disease  BILE DUCTS: Normal caliber.  GALLBLADDER: Within normal limits.  SPLEEN: Nonspecific scattered hypodense lesions in the spleen. Suggestion of subscapular calcifications.  PANCREAS: Within normal limits.  ADRENALS: Nonspecific bilateral hypodense nodules.  KIDNEYS: Symmetrically enhancing without hydronephrosis.    BLADDER: Decompressed with Sims catheter.  REPRODUCTIVE ORGANS: Hysterectomy.    BOWEL: Massively dilated fluid-filled, partially fecalized loops of small bowel with transition point in the pelvis (2:137) at the level of short segment diffuse circumferential wall thickening, suspicious for malignancy. Twisting of the small bowel mesentery also identified at the level of the transition point, possibility of bowel ischemia considered in the differential. Correlate with lactic acid. Gastrostomy tube in the stomach  PERITONEUM: Large pneumoperitoneum. Mild to moderate ascites with hyperdensity identified within the pelvis, extravasation of contrast suspected.  VESSELS: Within normal limits.  RETROPERITONEUM/LYMPH NODES: Mesenteric and retroperitoneal lymphadenopathy. For reference a para-aortic lymphnode measures 2.0 x 1.4 cm (2:65). Suggestion of omental caking or peritoneal carcinomatosis.  ABDOMINAL WALL: Within normal limits.  BONES: Degenerative changes.    IMPRESSION:  High-grade small bowel obstruction with transient point in the pelvis, as above. Diffuse focal circumferential small bowel wall thickening at the level of the obstruction concerning for nonspecific small bowel malignancy. Additional twisting of the small bowel mesentery identified at the level of the transition point, possibility of bowel ischemia considered in the differential. Correlate with lactic acid.  Large pneumoperitoneum concerning for bowel perforation. Mild to moderate ascites with hyperdensity identified within the pelvis, extravasation of contrast suspected.  Hepatic, splenic, and adrenal lesions concerning for metastatic disease. Retroperitoneal lymphadenopathy.Suggestion of omental caking or peritoneal carcinomatosis.  Small bilateral pleural effusions, left greater than right.  Additional findings as mentioned above.  Findings were discussed with Dr. MATHIEU MORSE [phone] 1/3/2021 1:27 AM by Dr. Jara with read back confirmation.      LASHANDA JARA MD; Resident Interventional Radiology  This document has been electronically signed.  ARLETTE CELIS MD; Attending Radiologist  This document has been electronically signed. Paco  3 2021  2:18AM    < end of copied text >

## 2021-01-03 NOTE — ED PROVIDER NOTE - PHYSICAL EXAMINATION
Gen: acutely distressed, in pain   HEENT: EOMI, no nasal discharge, mucous membranes moist  CV: RRR, no M/R/G  Resp: CTAB, labored breathing   GI: Abdomen soft, distended, diffusely tender, PEG in place draining bilious dark fluid  MSK: No open wounds, no bruising  Neuro: A&Ox3, following commands, moving all four extremities spontaneously  Skin: clean, appropriate for age

## 2021-01-03 NOTE — CHART NOTE - NSCHARTNOTEFT_GEN_A_CORE
GYN Oncology Fellow Note    Patient seen and examined with family. Pain poorly controlled currently. Will give 1 mg dilaudid IV now.  Discussed case with Palliative Care Fellow  Will start IV dilaudid gtt at 0.2 mg/hr and titrate as needed  Dilaudid 1 mg IV prn for pain  Robunol 0.4 mg q4h for secretions  Ativan 0.5 mg q4h for agitation.    Patient will be transferred to GYN Oncology Service for comfort care    [facility] GYN Oncology Fellow Note    Patient seen and examined with family. Pain poorly controlled currently. Will give 1 mg dilaudid IV now.  Discussed case with Palliative Care Fellow  Will start IV dilaudid gtt at 0.2 mg/hr and titrate as needed  Dilaudid 1 mg IV prn for pain  Robunol 0.4 mg q4h for secretions  Ativan 0.5 mg q4h for agitation.    Patient will be transferred to GYN Oncology Service for comfort care. Discussed plan for transfer with DIANA Castellon MD

## 2021-01-03 NOTE — ED PROVIDER NOTE - OBJECTIVE STATEMENT
60F hx metastatic ovarian cancer, hx SBO, w/ PEG tube, p/w syncope, weakness. Pt diagnosed w/ ovarian cancer in 2017, s/p EDWARDS/EDGAR p/w venting PEG for malignant bowel obstruction. Pt was on home hospice tolerating liquid only meals, this past evening on way to bathroom had syncopal episode, brought to ED for further eval.

## 2021-01-03 NOTE — ED PROVIDER NOTE - CARE PLAN
Principal Discharge DX:	Perforated bowel  Secondary Diagnosis:	SBO (small bowel obstruction)  Secondary Diagnosis:	Metastatic cancer

## 2021-01-03 NOTE — H&P ADULT - HISTORY OF PRESENT ILLNESS
60F hx metastatic ovarian cancer, hx SBO, w/ PEG tube, p/w syncope, weakness. Pt diagnosed w/ ovarian cancer in 2017, s/p EDWARDS/EDGAR p/w venting PEG for malignant bowel obstruction. Pt was on home hospice tolerating liquid only meals, this past evening on way to bathroom had syncopal episode, brought to ED for further eval. pt found to have pneumoperitoneum , comfort measures only  60F hx metastatic ovarian cancer, hx SBO, w/ PEG tube, p/w syncope, weakness. Pt diagnosed w/ ovarian cancer in 2017, s/p QUINTON/BSO and multiple lines of chemotherapy. She developed progressive disease and a bowel obstruction and approximately 2-3 months ago underwent venting PEG and initiation of home hospice. Per her sons she was ambulating and tolerating liquid only meals, however, this past evening on way to bathroom had syncopal episode and brought to ED for further eval. Imaging on ED evaluation demonstrated pneumoperitoneum consistent with bowel perforation. Clinically patient has peritoneal signs, tachycardia and hypotension consist with bowel perforation and sepsis.

## 2021-01-03 NOTE — CONSULT NOTE ADULT - ASSESSMENT
60F hx metastatic ovarian carcinoma s/p hysterectomy and BSO (2017) c/b malignant SBO s/p venting PEG presenting from home hospice with syncope found to have pneumoperitoneum likely due to a perforated viscus due to malignant bowel obstruction    - Son at bedside who is the healthcare proxy, states he and his family would like all interventions done at this time despite home hospice and prior DNR/DNI status  - Typically, pneumoperitoneum due to perforated viscus requires surgical exploration for washout/source control, and repair of injury  - However, surgical intervention in this clinical scenario is unlikely to prolong meaningful quality of life and may cause increased suffering  - Furthermore, pt is at high risk for postoperative complications given her advanced cancer and poor nutritional status  - Therefore, surgical intervention is not being offered at this time  - Recommend supportive care with IV antibiotics, bowel rest, and IV hydration  - Appreciate gynecology/oncology input  - Additionally recommend liberal pain control and palliative care consultation    d/w Dr. Brenda Payne, PGY-3  Surgical Oncology (D Team)  [id number] with questions 
61y/o F with recurrent ovarian cancer s/p hysterectomy and BSO (2017) as well as chemotherapy (discontinued 2020) with course complicated by malignant SBO s/p venting PEG placement at [facility] 11/2020 discharged home on hospice. Patient presenting after syncopal episode at home, found to have likely perforated viscus 2/2 malignant SBO. Patient hypotensive and tachycardic while in ED, minimally fluid responsive. Patient and son initially verbalized desire for surgery, however after discussion with care teams (surgical oncology, gynecologic oncology and ED team) now desiring comfort care. Patients son and healthcare proxy verbalizes understanding that surgical intervention in this scenario would be unlikely to prolong a meaningful quality of life, and may lead to increased suffering. Patient and family are currently declining pressors. Patient is DNR/DNI.      - Agree with ED decision to admit to medicine service for comfort care.    - Appreciate ED assistance in transferring patient to private room as quickly as possible.   - DNR/DNI in accordance with wishes of patient and her family.    - Would recommend pain control with IV narcotic gtt.    d/w Dr. Ravinder Ireland, PGY-2

## 2021-01-03 NOTE — CHART NOTE - NSCHARTNOTEFT_GEN_A_CORE
Palliative received consult for symptom management at EOL in setting of metastatic ovarian cancer with perforated bowel. Chart reviewed. Discussed case with Gyn Resident, Gerhard, who confirmed patient receives her onc care at [facility]. Patient is DNR/DNI, focus on comfort measures. She received fentanyl 50mcg x5 in ED.     Recommendations for symptom management:     Pain 2/2 perforated bowel from metastatic ovarian cancer  - Started on 0.2mg IV dilaudid gtt  - Recommend 1mg IV Dilaudid q1hr prn pain   - Reassess routinely and can consider increasing dilaudid gtt to 0.5mg if pain is not controlled.     Dyspnea  - Dilaudid gtt and prn's as above  - For oral secretions, glycopyrrolate 0.4mg IV q4hrs prn.  - Can order scheduled glycopyrrolate if secretions are uncontrolled.   - Can add scopalamine patch for increased assistance with oral secretions     Agitation   - Can start 0.5mg IV Ativan q2hrs prn agitation/restlessness     Please ensure MOLST is done with DNR/DNI and focus on comfort measures.     Thank you for allowing us to participate in your patient's care. Please page [phone] for any q's or c's.     Ainsley Monk D.O.   Palliative Medicine

## 2021-01-03 NOTE — ED PROVIDER NOTE - PROGRESS NOTE DETAILS
daryl: pt w/ high grade SBO w/ perf, pneumoperitoneum. discussed w/ surgery, gyn onc, pt not a surgical candidate at this time given prognosis, current medical condition. Pt has been repeatedly hypotensive requiring fluid boluses during ED stay, will start on maintenance now. Discussed extensively w/ son & filled out molst, pt DNR DNI w/ medical intervention accepted to level of abx however son DENIED PRESSORS - pt admitted to medicine & son at bedside understands pt's declining condition.

## 2021-01-03 NOTE — ED PROVIDER NOTE - CLINICAL SUMMARY MEDICAL DECISION MAKING FREE TEXT BOX
60F w/ hx metastatic ovarian cancer w/ malignant SBO p/w bowel distention, syncope, inability to tolerate po. Concern for obstructive bowel etiology. Pt also hypotensive, unwell appearing, will treat w/ pain management, labs, imaging, discuss molst with son @ bedside.

## 2021-01-04 NOTE — DISCHARGE NOTE FOR THE EXPIRED PATIENT - HOSPITAL COURSE
60y woman with recurrent ovarian cancer was admitted 1/3/2021 after an episode of syncope at home. On admission, CT scan showed progression of disease with malignant SBO and pneumoperitoneum concerning for perforation. Due to patient's poor prognosis, surgical management was not offered; patient and family opted for supportive care with IV antibiotics and pain management. Patient and family opted to maintain her DNR/DNI status, and a MOLST form was completed and placed in the patient's chart. Patient was started on Zosyn  per surgical oncology as well as dilaudid gtt per paliative care. Throughout hospital day 2, Ms. Hogue was intermittently tachycardic and hypotensive, but appeared overall comfortable. In the morning of HD#3, she was noted to be pulseless on nursing rounds. MD and PA called to bedside, patient had no pulse, no detectable blood pressure, no respiratory activity, and had no pupillary response to light or pain response to noxious stimuli. Death was pronounced at 9:07am. Son Brice was at bedside. Patient's  and sister called and informed, and  called to bedside per son's request. 60y woman with known recurrent ovarian cancer was admitted 1/3/2021 after an episode of syncope at home. Was enrolled in home hospice with chronic malignant SBO per MD conversation with primary oncologist Dr. Wen at [facility]. On admission, CT scan showed progression of disease with malignant SBO and pneumoperitoneum concerning for perforation. Due to patient's poor prognosis, surgical management was not offered; patient and family opted for supportive care with IV antibiotics and pain management. Patient and family opted to maintain her DNR/DNI status, and a MOLST form was completed and placed in the patient's chart. Patient was started on Zosyn  per surgical oncology as well as dilaudid gtt per paliative care. Throughout hospital day 2, Ms. Hogue was intermittently tachycardic and hypotensive, but appeared overall comfortable. In the morning of HD#3, she was noted to be pulseless on nursing rounds. MD and PA called to bedside, patient had no pulse, no detectable blood pressure, no respiratory activity, and had no pupillary response to light or pain response to noxious stimuli. Death was pronounced at 9:07am. Son Brice was at bedside. Patient's  and sister called and informed, and  called to bedside per son's request.

## 2021-01-04 NOTE — PROGRESS NOTE ADULT - PROBLEM SELECTOR PLAN 1
Neuro: Pain controlled with Dilaudid gtt and Dilaudid prn. Ativan 0.5 mg q4h for agitation.  CV: Patient tachycardic and hypotensive intermittently overnight however no interventions desired by family and patient    Pulm: Robunol 0.4 mg q4h for secretions.   GI: NPO  : Sims in place  ID: afebrile, c/w zosyn per family request, continue to monitor vital signs  Heme: No DVT ppx required.  Dispo: c/w comfort care. Appreciate Palliative medicine recommendations.  MOLST completed with DNR/I      Farhana Quintero PGY4

## 2021-01-04 NOTE — PROGRESS NOTE ADULT - ASSESSMENT
A/P  60y F admitted with perforated viscous   Neuro: Pain controlled withDilaudid gtt and Dilaudid prn.  CV: Hemodynamically   Pulm: Oxygenating well on room air, continue IS  GI: Tolerating  : Sims in place  ID: afebrile, coner, continue to monitor vital signs  Heme: DVT prophylaxis with   , early ambulation and SCDs    Farhana Quintero PGY4 A/P: 61y/o F with metastatic ovarian cancer s/p hysterectomy and BSO (2017), chemotherapy (discontinued 2020), and placement of venting PEG (11/2020) for malignant SBO. Patient admitted for comfort care with perforated viscus 2/2 malignant SBO. Patient is DNR/DNI.    A/P: 61y/o F with recurrent ovarian cancer s/p hysterectomy and BSO (2017), chemotherapy (discontinued 2020), and placement of venting PEG (11/2020) for malignant SBO on home hospice. Patient admitted for comfort care with perforated viscus 2/2 malignant SBO. Patient is DNR/DNI.

## 2021-01-04 NOTE — PROGRESS NOTE ADULT - SUBJECTIVE AND OBJECTIVE BOX
GYN ONC Progress Note     Subjective  Patient evaluated at bedside by Gyn/Onc team. No events overnight.    Denies fever/chills, CP/SOB, dizziness/lightheadedness, nausea/vomiting.   Patient remains on Dilaudid gtt for comfort care. Remains NPO with Sims in place.    glycopyrrolate Injectable 0.4 milliGRAM(s) IV Push every 4 hours  HYDROmorphone  Injectable 1 milliGRAM(s) IV Push every 1 hour PRN  HYDROmorphone Infusion 0.2 mG/Hr IV Continuous <Continuous>  LORazepam   Injectable 0.5 milliGRAM(s) IV Push every 2 hours PRN  piperacillin/tazobactam IVPB.. 3.375 Gram(s) IV Intermittent every 8 hours  sodium chloride 0.9%. 1000 milliLiter(s) IV Continuous <Continuous>      Objective  Physical exam  VS:  T(C): 36.4 (01-04-21 @ 05:47), Max: 36.7 (01-03-21 @ 21:32)  HR: 102 (01-04-21 @ 05:47) (93 - 126)  BP: 75/49 (01-04-21 @ 05:47) (75/49 - 97/73)  RR: 3 (01-04-21 @ 05:47) (3 - 5)  SpO2: 92% (01-04-21 @ 05:47) (92% - 94%)    Gen: No acute distress, alert and oriented  CV: Regular rate and rhythm  Pulm: Clear to ascultation bilaterally  Abd:  Ext: nontender bilaterally, SCDs in place      01-03 @ 07:01  -  01-04 @ 06:08  --------------------------------------------------------  IN: 701 mL / OUT: 960 mL / NET: -259 mL               GYN ONC Progress Note     Subjective  Patient seen at bedside with son at bedside. Exam deferred as patient sleeping while at bedside. Per son no events overnight and patient with good pain control overnight.     Patient remains on Dilaudid gtt for comfort care. Remains NPO with Sims in place.    glycopyrrolate Injectable 0.4 milliGRAM(s) IV Push every 4 hours  HYDROmorphone  Injectable 1 milliGRAM(s) IV Push every 1 hour PRN  HYDROmorphone Infusion 0.2 mG/Hr IV Continuous <Continuous>  LORazepam   Injectable 0.5 milliGRAM(s) IV Push every 2 hours PRN  piperacillin/tazobactam IVPB.. 3.375 Gram(s) IV Intermittent every 8 hours  sodium chloride 0.9%. 1000 milliLiter(s) IV Continuous <Continuous>      Objective  Physical exam  VS:  T(C): 36.4 (01-04-21 @ 05:47), Max: 36.7 (01-03-21 @ 21:32)  HR: 102 (01-04-21 @ 05:47) (93 - 126)  BP: 75/49 (01-04-21 @ 05:47) (75/49 - 97/73)  RR: 3 (01-04-21 @ 05:47) (3 - 5)  SpO2: 92% (01-04-21 @ 05:47) (92% - 94%)    Physical exam deferred at this time.     01-03 @ 07:01  -  01-04 @ 06:08  --------------------------------------------------------  IN: 701 mL / OUT: 960 mL / NET: -259 mL

## 2021-01-04 NOTE — PROGRESS NOTE ADULT - PROBLEM SELECTOR PLAN 2
Fellow Note    Patient seen and per son has been comfortable overnight.    VS reviewed  No labs    Continue dilaudid gtt  Ativan prn  Robunol for secretions  Son understands poor prognosis with focus on MsKen Hogue's comfort    Ravinder LI

## 2021-01-08 LAB
CULTURE RESULTS: SIGNIFICANT CHANGE UP
CULTURE RESULTS: SIGNIFICANT CHANGE UP
SPECIMEN SOURCE: SIGNIFICANT CHANGE UP
SPECIMEN SOURCE: SIGNIFICANT CHANGE UP
